# Patient Record
Sex: FEMALE | Race: WHITE | NOT HISPANIC OR LATINO | URBAN - METROPOLITAN AREA
[De-identification: names, ages, dates, MRNs, and addresses within clinical notes are randomized per-mention and may not be internally consistent; named-entity substitution may affect disease eponyms.]

---

## 2020-07-01 ENCOUNTER — INPATIENT (INPATIENT)
Facility: HOSPITAL | Age: 70
LOS: 0 days | Discharge: HOME HEALTH | End: 2020-07-02
Attending: INTERNAL MEDICINE | Admitting: INTERNAL MEDICINE
Payer: MEDICARE

## 2020-07-01 VITALS
RESPIRATION RATE: 19 BRPM | OXYGEN SATURATION: 100 % | HEART RATE: 56 BPM | SYSTOLIC BLOOD PRESSURE: 185 MMHG | DIASTOLIC BLOOD PRESSURE: 85 MMHG

## 2020-07-01 DIAGNOSIS — Z96.653 PRESENCE OF ARTIFICIAL KNEE JOINT, BILATERAL: Chronic | ICD-10-CM

## 2020-07-01 DIAGNOSIS — Z98.890 OTHER SPECIFIED POSTPROCEDURAL STATES: Chronic | ICD-10-CM

## 2020-07-01 DIAGNOSIS — K63.2 FISTULA OF INTESTINE: Chronic | ICD-10-CM

## 2020-07-01 LAB
ALBUMIN SERPL ELPH-MCNC: 4.1 G/DL — SIGNIFICANT CHANGE UP (ref 3.5–5.2)
ALP SERPL-CCNC: 72 U/L — SIGNIFICANT CHANGE UP (ref 30–115)
ALT FLD-CCNC: 7 U/L — SIGNIFICANT CHANGE UP (ref 0–41)
ANION GAP SERPL CALC-SCNC: 20 MMOL/L — HIGH (ref 7–14)
APPEARANCE UR: CLEAR — SIGNIFICANT CHANGE UP
APTT BLD: 24.6 SEC — LOW (ref 27–39.2)
AST SERPL-CCNC: 12 U/L — SIGNIFICANT CHANGE UP (ref 0–41)
BASE EXCESS BLDV CALC-SCNC: 4.9 MMOL/L — HIGH (ref -2–2)
BASOPHILS # BLD AUTO: 0.04 K/UL — SIGNIFICANT CHANGE UP (ref 0–0.2)
BASOPHILS NFR BLD AUTO: 0.5 % — SIGNIFICANT CHANGE UP (ref 0–1)
BILIRUB SERPL-MCNC: 1.2 MG/DL — SIGNIFICANT CHANGE UP (ref 0.2–1.2)
BILIRUB UR-MCNC: ABNORMAL
BUN SERPL-MCNC: 17 MG/DL — SIGNIFICANT CHANGE UP (ref 10–20)
CA-I SERPL-SCNC: 1.16 MMOL/L — SIGNIFICANT CHANGE UP (ref 1.12–1.3)
CALCIUM SERPL-MCNC: 10.2 MG/DL — HIGH (ref 8.5–10.1)
CHLORIDE SERPL-SCNC: 89 MMOL/L — LOW (ref 98–110)
CO2 SERPL-SCNC: 26 MMOL/L — SIGNIFICANT CHANGE UP (ref 17–32)
COLOR SPEC: YELLOW — SIGNIFICANT CHANGE UP
CREAT SERPL-MCNC: 0.7 MG/DL — SIGNIFICANT CHANGE UP (ref 0.7–1.5)
DIFF PNL FLD: NEGATIVE — SIGNIFICANT CHANGE UP
EOSINOPHIL # BLD AUTO: 0.02 K/UL — SIGNIFICANT CHANGE UP (ref 0–0.7)
EOSINOPHIL NFR BLD AUTO: 0.2 % — SIGNIFICANT CHANGE UP (ref 0–8)
GAS PNL BLDV: 133 MMOL/L — LOW (ref 136–145)
GAS PNL BLDV: SIGNIFICANT CHANGE UP
GLUCOSE SERPL-MCNC: 113 MG/DL — HIGH (ref 70–99)
GLUCOSE UR QL: NEGATIVE MG/DL — SIGNIFICANT CHANGE UP
HCO3 BLDV-SCNC: 30 MMOL/L — HIGH (ref 22–29)
HCT VFR BLD CALC: 45.8 % — SIGNIFICANT CHANGE UP (ref 37–47)
HCT VFR BLDA CALC: 49.5 % — HIGH (ref 34–44)
HGB BLD CALC-MCNC: 16.1 G/DL — SIGNIFICANT CHANGE UP (ref 14–18)
HGB BLD-MCNC: 16.1 G/DL — HIGH (ref 12–16)
HOROWITZ INDEX BLDV+IHG-RTO: 21 — SIGNIFICANT CHANGE UP
IMM GRANULOCYTES NFR BLD AUTO: 0.8 % — HIGH (ref 0.1–0.3)
INR BLD: 1.02 RATIO — SIGNIFICANT CHANGE UP (ref 0.65–1.3)
KETONES UR-MCNC: 40
LACTATE BLDV-MCNC: 1.9 MMOL/L — HIGH (ref 0.5–1.6)
LEUKOCYTE ESTERASE UR-ACNC: NEGATIVE — SIGNIFICANT CHANGE UP
LYMPHOCYTES # BLD AUTO: 0.72 K/UL — LOW (ref 1.2–3.4)
LYMPHOCYTES # BLD AUTO: 8.6 % — LOW (ref 20.5–51.1)
MAGNESIUM SERPL-MCNC: 1.7 MG/DL — LOW (ref 1.8–2.4)
MCHC RBC-ENTMCNC: 31 PG — SIGNIFICANT CHANGE UP (ref 27–31)
MCHC RBC-ENTMCNC: 35.2 G/DL — SIGNIFICANT CHANGE UP (ref 32–37)
MCV RBC AUTO: 88.1 FL — SIGNIFICANT CHANGE UP (ref 81–99)
MONOCYTES # BLD AUTO: 0.62 K/UL — HIGH (ref 0.1–0.6)
MONOCYTES NFR BLD AUTO: 7.4 % — SIGNIFICANT CHANGE UP (ref 1.7–9.3)
NEUTROPHILS # BLD AUTO: 6.89 K/UL — HIGH (ref 1.4–6.5)
NEUTROPHILS NFR BLD AUTO: 82.5 % — HIGH (ref 42.2–75.2)
NITRITE UR-MCNC: NEGATIVE — SIGNIFICANT CHANGE UP
NRBC # BLD: 0 /100 WBCS — SIGNIFICANT CHANGE UP (ref 0–0)
PCO2 BLDV: 46 MMHG — SIGNIFICANT CHANGE UP (ref 41–51)
PH BLDV: 7.43 — SIGNIFICANT CHANGE UP (ref 7.26–7.43)
PH UR: 6.5 — SIGNIFICANT CHANGE UP (ref 5–8)
PLATELET # BLD AUTO: 370 K/UL — SIGNIFICANT CHANGE UP (ref 130–400)
PO2 BLDV: 28 MMHG — SIGNIFICANT CHANGE UP (ref 20–40)
POTASSIUM BLDV-SCNC: 3.3 MMOL/L — SIGNIFICANT CHANGE UP (ref 3.3–5.6)
POTASSIUM SERPL-MCNC: 4.2 MMOL/L — SIGNIFICANT CHANGE UP (ref 3.5–5)
POTASSIUM SERPL-SCNC: 4.2 MMOL/L — SIGNIFICANT CHANGE UP (ref 3.5–5)
PROT SERPL-MCNC: 6.6 G/DL — SIGNIFICANT CHANGE UP (ref 6–8)
PROT UR-MCNC: NEGATIVE MG/DL — SIGNIFICANT CHANGE UP
PROTHROM AB SERPL-ACNC: 11.7 SEC — SIGNIFICANT CHANGE UP (ref 9.95–12.87)
RBC # BLD: 5.2 M/UL — SIGNIFICANT CHANGE UP (ref 4.2–5.4)
RBC # FLD: 12.7 % — SIGNIFICANT CHANGE UP (ref 11.5–14.5)
SAO2 % BLDV: 50 % — SIGNIFICANT CHANGE UP
SARS-COV-2 RNA SPEC QL NAA+PROBE: SIGNIFICANT CHANGE UP
SODIUM SERPL-SCNC: 135 MMOL/L — SIGNIFICANT CHANGE UP (ref 135–146)
SP GR SPEC: 1.01 — SIGNIFICANT CHANGE UP (ref 1.01–1.03)
TROPONIN T SERPL-MCNC: <0.01 NG/ML — SIGNIFICANT CHANGE UP
UROBILINOGEN FLD QL: 0.2 MG/DL — SIGNIFICANT CHANGE UP (ref 0.2–0.2)
WBC # BLD: 8.36 K/UL — SIGNIFICANT CHANGE UP (ref 4.8–10.8)
WBC # FLD AUTO: 8.36 K/UL — SIGNIFICANT CHANGE UP (ref 4.8–10.8)

## 2020-07-01 PROCEDURE — 99223 1ST HOSP IP/OBS HIGH 75: CPT

## 2020-07-01 PROCEDURE — 72170 X-RAY EXAM OF PELVIS: CPT | Mod: 26

## 2020-07-01 PROCEDURE — 71045 X-RAY EXAM CHEST 1 VIEW: CPT | Mod: 26

## 2020-07-01 PROCEDURE — 73590 X-RAY EXAM OF LOWER LEG: CPT | Mod: 26,LT

## 2020-07-01 PROCEDURE — 70450 CT HEAD/BRAIN W/O DYE: CPT | Mod: 26

## 2020-07-01 PROCEDURE — 72125 CT NECK SPINE W/O DYE: CPT | Mod: 26

## 2020-07-01 PROCEDURE — 99285 EMERGENCY DEPT VISIT HI MDM: CPT

## 2020-07-01 RX ORDER — AMLODIPINE BESYLATE 2.5 MG/1
2.5 TABLET ORAL ONCE
Refills: 0 | Status: COMPLETED | OUTPATIENT
Start: 2020-07-01 | End: 2020-07-01

## 2020-07-01 RX ORDER — MAGNESIUM SULFATE 500 MG/ML
2 VIAL (ML) INJECTION ONCE
Refills: 0 | Status: COMPLETED | OUTPATIENT
Start: 2020-07-01 | End: 2020-07-01

## 2020-07-01 RX ORDER — LABETALOL HCL 100 MG
200 TABLET ORAL EVERY 12 HOURS
Refills: 0 | Status: DISCONTINUED | OUTPATIENT
Start: 2020-07-01 | End: 2020-07-01

## 2020-07-01 RX ORDER — SODIUM CHLORIDE 9 MG/ML
1000 INJECTION INTRAMUSCULAR; INTRAVENOUS; SUBCUTANEOUS
Refills: 0 | Status: DISCONTINUED | OUTPATIENT
Start: 2020-07-01 | End: 2020-07-02

## 2020-07-01 RX ORDER — SODIUM CHLORIDE 9 MG/ML
1000 INJECTION INTRAMUSCULAR; INTRAVENOUS; SUBCUTANEOUS ONCE
Refills: 0 | Status: COMPLETED | OUTPATIENT
Start: 2020-07-01 | End: 2020-07-01

## 2020-07-01 RX ORDER — HYDRALAZINE HCL 50 MG
50 TABLET ORAL EVERY 12 HOURS
Refills: 0 | Status: DISCONTINUED | OUTPATIENT
Start: 2020-07-01 | End: 2020-07-02

## 2020-07-01 RX ADMIN — AMLODIPINE BESYLATE 2.5 MILLIGRAM(S): 2.5 TABLET ORAL at 23:55

## 2020-07-01 RX ADMIN — Medication 50 MILLIGRAM(S): at 18:18

## 2020-07-01 RX ADMIN — SODIUM CHLORIDE 80 MILLILITER(S): 9 INJECTION INTRAMUSCULAR; INTRAVENOUS; SUBCUTANEOUS at 18:19

## 2020-07-01 RX ADMIN — SODIUM CHLORIDE 1000 MILLILITER(S): 9 INJECTION INTRAMUSCULAR; INTRAVENOUS; SUBCUTANEOUS at 15:08

## 2020-07-01 RX ADMIN — Medication 50 GRAM(S): at 15:08

## 2020-07-01 NOTE — ED ADULT NURSE NOTE - CHPI ED NUR SYMPTOMS NEG
no change in level of consciousness/no fever/no vomiting/no blurred vision/no weakness/no loss of consciousness/no nausea/no numbness/no dizziness

## 2020-07-01 NOTE — ED PROVIDER NOTE - CLINICAL SUMMARY MEDICAL DECISION MAKING FREE TEXT BOX
69yF unknown PMH p/w AMS - likely metabolic encephalopathy, possibly dehydration given hypochloremia (though low chloride also possibly compensatory given other concurrent acid-base derangements).  CXR and UA w/o apparent infection, though UA w/ ketones suggestive of poor recent PO intake.  Pt w/o apparent traumatic injury, pleasant if confused, well kempt.  May be subacute presentation of dementia, patrick given mis-identification of family members, paranoia about 's behavior, but will benefit from admission and further workup.

## 2020-07-01 NOTE — H&P ADULT - HISTORY OF PRESENT ILLNESS
The pt is a 69y F with no PMH is presenting to ED with AMS x 4 days. Pt's  states that she has been having delusions of her half-brother living in their house and stealing her belongings. He also endorses periods of confusion where she is talking to herself and denies when asked if she was. Patient has been admitted to [facility] many years ago for a similar issue, but  is unable to recall what her diagnosis was at the time. She has had very poor oral intake in the last week. Patient herself is alert and oriented x 4. She was insistent that her half-brother is at still her house while I was speaking to her. MMSE score was 30

## 2020-07-01 NOTE — ED ADULT TRIAGE NOTE - CHIEF COMPLAINT QUOTE
BIBA via [facility] from home, EMS states that  called because patient has been experiencing confusion for "a couple of days" states that she mistakes him for her brother in law, and has decreased PO intake. on arrival patient is oriented to person unable to explain why EMS was called.

## 2020-07-01 NOTE — ED PROVIDER NOTE - CARE PLAN
Principal Discharge DX:	Encephalopathy  Secondary Diagnosis:	Hypochloremia  Secondary Diagnosis:	Hypomagnesemia  Secondary Diagnosis:	Fall from standing, initial encounter Principal Discharge DX:	Encephalopathy  Secondary Diagnosis:	Hypochloremia  Secondary Diagnosis:	Hypomagnesemia  Secondary Diagnosis:	Fall from standing, initial encounter  Secondary Diagnosis:	Inability to perform activities of daily living

## 2020-07-01 NOTE — ED PROVIDER NOTE - ATTENDING CONTRIBUTION TO CARE
69yF w/o known PMH (as per pt, no records seen in EMR) p/w altered mental status. Hx provided by pt w/ collateral from .  Pt says she has been a little weak for the past few days and fell yesterday onto her backside.  She denies hitting her head.  Denies fever, abd pain, n/v/d, CP, SOB.  Denies any PMH or prescribed medications.  Hx provided by  is that she has been confused, which worsened today.  She thinks her  is her step-brother and says he has been living with her for the past 6mo, has been stealing from her and refuses to leave when she asks him to do so and thinks he is the cause of her ED visit today unjustly.    VS notable for /85  CONSTITUTIONAL: well developed; well nourished; well appearing in no acute distress  HEAD: normocephalic; atraumatic  EYES: no conjunctival injection, no scleral icterus  ENT: no nasal discharge; airway clear, mildly dry MM  NECK: supple; non tender. + full passive ROM in all directions, no c-spine tenderness to palpation   CARD: S1, S2 normal; no murmurs, gallops, or rubs. Regular rate and rhythm  RESP: no wheezes, rales or rhonchi. Good air movement bilaterally without significant accessory muscle use  ABD: soft; non-distended; non-tender. No rebound, no guarding, no pulsatile abdominal mass  EXT: moving all extremities spontaneously, normal ROM. No clubbing, cyanosis or edema  SKIN: warm and dry, no lesions noted  NEURO: alert, oriented, CN II-XII grossly intact, motor and sensory grossly intact, speech nonslurred, no focal deficits. GCS 15  PSYCH: calm, cooperative, appropriate, good eye contact, logical thought process, no apparent danger to self or others

## 2020-07-01 NOTE — ED PROVIDER NOTE - NS ED ROS FT
GEN: (-) fever, (-) chills, (-) malaise, (+) AMS  HEENT: (-) vision changes, (-) HA  CV: (-) chest pain, (-) palpitations, (-) edema  PULM: (-) cough, (-) wheezing, (-) dyspnea  GI: (-) abdominal pain,(-) Nausea, (-) Vomiting, (-) Diarrhea, (-) Melena  NEURO: (-) weakness, (-) paresthesias, (-) syncope, (-) lightheadedness, (-) dizziness  : (-) dysuria, (+) frequency, (-) urgency, (-) incontinence, (-) hematuria  MS: (-) back pain, (-) joint pain, (-)myalgias, (-) swelling  SKIN: (-) rashes, (-) new lesions  HEME: (-) bleeding, (-) ecchymosis  PSYCH: (-) anxiety, (-) depression, (-) hallucinations, (-) suicidal ideation

## 2020-07-01 NOTE — ED PROVIDER NOTE - PHYSICAL EXAMINATION
GEN: Alert & Oriented x 2, No acute distress. Calm, appropriate.  Head and Neck: Normocephalic, atraumatic.   ENT:Oral mucosa pink, dry without lesions.   Eyes: PERRL. EOMI, Horizontal nystagmus. No conjunctival injection. No scleral icterus.   RESP: Lungs clear to auscult bilat. no wheezes, rhonchi or rales. No retractions. Equal air entry.  CARDIO: regular rate and rhythm, no murmurs, rubs or gallops. Normal S1, S2.  Radial pulses 2+ bilaterally. No lower extremity edema.  ABD: Soft, Nondistended. No rebound tenderness/guarding.  No pulsatile mass. No tenderness with palpation x 4 quadrants.  MS: no midline tenderness throughout. FROM neck and upper/lower ext.   SKIN: no rashes/lesions, no petechiae. ecchymosis noted to left lower ext.  Neuro: A&O x2, CN II- XII intact, strength 5/5 throughout extremities, sensation intact. Speech & mentation intact. No drooping of eye or mouth. Without dysarthria or aphasia. Finger to nose intact. no nuchal rigidity.   PSYCH: Appropriate affect. No hallucinations

## 2020-07-01 NOTE — H&P ADULT - ASSESSMENT
69y F with no PMH is presenting to ED with AMS x 4 days. Admitted for metabolic encephalopathy and paranoid delusions    #Metabolic encephalopathy d/t poor oral intake/dehydration with paranoid delusions  - Ketones positive in urine  - Psych consult requested  - PT eval  - IVF hydration  - Repeat BMP    #h/o b/l Knee replacement  - Tylenol prn for pain  - PT    DVT PPX, heparin 69y F with no PMH is presenting to ED with AMS x 4 days. Admitted for metabolic encephalopathy and paranoid delusions    #Metabolic encephalopathy/Weakness d/t poor oral intake/dehydration + paranoid delusions  - Ketones positive in urine indicating poor oral intake  - MMSE 30/30  - Psych consult requested  - PT eval  - IVF hydration  - Repeat BMP  - f/u B12, folate, TSH    #h/o b/l Knee replacement  - Tylenol prn for pain  - PT    DVT PPX, heparin

## 2020-07-01 NOTE — ED PROVIDER NOTE - SECONDARY DIAGNOSIS.
Hypochloremia Hypomagnesemia Fall from standing, initial encounter Inability to perform activities of daily living

## 2020-07-01 NOTE — PATIENT PROFILE ADULT - DISASTER - NSPROGENBLOODRESTRICT_GEN_A_NUR
Pre-Visit Chart Review  For Appointment Scheduled on 1/4/16.    Health Maintenance Due   Topic Date Due    TETANUS VACCINE  06/29/1952    Zoster Vaccine  06/29/1996    Influenza Vaccine  08/01/2016    Pneumococcal (65+) (2 of 2 - PPSV23) 12/12/2016                     
none

## 2020-07-01 NOTE — ED PROVIDER NOTE - OBJECTIVE STATEMENT
The pt is a 69y F with no PMH is presenting to ED with AMS x 4 days. Per  pt has had worsening confusion over the past 4 days, he states she thinks he is her brother in law and getting agitated. He also states she has had decreased PO intake and generalized weakness. He states this has happened in the past, but only lasted a day or so. pt endorses urinary frequency and states she fell onto her bottom 1 wk ago, denies hitting head or LOC. Pt denies dizziness, lightheadedness, cp, sob, cough, f/c/n/v/d, dysuria, hematuria, back pain, neck pain, HA.

## 2020-07-01 NOTE — ED ADULT NURSE NOTE - INTERVENTIONS DEFINITIONS
[place] to call system/Provide visual cue, wrist band, yellow gown, etc./Monitor gait and stability/Instruct patient to call for assistance/Call bell, personal items and telephone within reach/Room bathroom lighting operational/Non-slip footwear when patient is off stretcher/Physically safe environment: no spills, clutter or unnecessary equipment/Provide visual clues: red socks/Stretcher in lowest position, wheels locked, appropriate side rails in place/Monitor for mental status changes and reorient to person, place, and time

## 2020-07-02 ENCOUNTER — TRANSCRIPTION ENCOUNTER (OUTPATIENT)
Age: 70
End: 2020-07-02

## 2020-07-02 VITALS — SYSTOLIC BLOOD PRESSURE: 107 MMHG | DIASTOLIC BLOOD PRESSURE: 59 MMHG | TEMPERATURE: 97 F | HEART RATE: 72 BPM

## 2020-07-02 PROBLEM — Z00.00 ENCOUNTER FOR PREVENTIVE HEALTH EXAMINATION: Status: ACTIVE | Noted: 2020-07-02

## 2020-07-02 LAB
ANION GAP SERPL CALC-SCNC: 17 MMOL/L — HIGH (ref 7–14)
BUN SERPL-MCNC: 13 MG/DL — SIGNIFICANT CHANGE UP (ref 10–20)
CALCIUM SERPL-MCNC: 9.3 MG/DL — SIGNIFICANT CHANGE UP (ref 8.5–10.1)
CHLORIDE SERPL-SCNC: 91 MMOL/L — LOW (ref 98–110)
CO2 SERPL-SCNC: 23 MMOL/L — SIGNIFICANT CHANGE UP (ref 17–32)
CREAT SERPL-MCNC: 0.6 MG/DL — LOW (ref 0.7–1.5)
CULTURE RESULTS: NO GROWTH — SIGNIFICANT CHANGE UP
GLUCOSE SERPL-MCNC: 94 MG/DL — SIGNIFICANT CHANGE UP (ref 70–99)
HCT VFR BLD CALC: 45.9 % — SIGNIFICANT CHANGE UP (ref 37–47)
HCV AB S/CO SERPL IA: 0.03 COI — SIGNIFICANT CHANGE UP
HCV AB SERPL-IMP: SIGNIFICANT CHANGE UP
HGB BLD-MCNC: 16 G/DL — SIGNIFICANT CHANGE UP (ref 12–16)
MCHC RBC-ENTMCNC: 31.1 PG — HIGH (ref 27–31)
MCHC RBC-ENTMCNC: 34.9 G/DL — SIGNIFICANT CHANGE UP (ref 32–37)
MCV RBC AUTO: 89.3 FL — SIGNIFICANT CHANGE UP (ref 81–99)
NRBC # BLD: 0 /100 WBCS — SIGNIFICANT CHANGE UP (ref 0–0)
PLATELET # BLD AUTO: 388 K/UL — SIGNIFICANT CHANGE UP (ref 130–400)
POTASSIUM SERPL-MCNC: 3.6 MMOL/L — SIGNIFICANT CHANGE UP (ref 3.5–5)
POTASSIUM SERPL-SCNC: 3.6 MMOL/L — SIGNIFICANT CHANGE UP (ref 3.5–5)
RBC # BLD: 5.14 M/UL — SIGNIFICANT CHANGE UP (ref 4.2–5.4)
RBC # FLD: 12.8 % — SIGNIFICANT CHANGE UP (ref 11.5–14.5)
SARS-COV-2 IGG SERPL QL IA: NEGATIVE — SIGNIFICANT CHANGE UP
SARS-COV-2 IGM SERPL IA-ACNC: 0.09 INDEX — SIGNIFICANT CHANGE UP
SODIUM SERPL-SCNC: 131 MMOL/L — LOW (ref 135–146)
SPECIMEN SOURCE: SIGNIFICANT CHANGE UP
WBC # BLD: 9.31 K/UL — SIGNIFICANT CHANGE UP (ref 4.8–10.8)
WBC # FLD AUTO: 9.31 K/UL — SIGNIFICANT CHANGE UP (ref 4.8–10.8)

## 2020-07-02 PROCEDURE — 99221 1ST HOSP IP/OBS SF/LOW 40: CPT

## 2020-07-02 PROCEDURE — 99238 HOSP IP/OBS DSCHRG MGMT 30/<: CPT

## 2020-07-02 RX ORDER — RISPERIDONE 4 MG/1
1 TABLET ORAL
Qty: 60 | Refills: 0
Start: 2020-07-02 | End: 2020-07-31

## 2020-07-02 RX ADMIN — Medication 50 MILLIGRAM(S): at 05:23

## 2020-07-02 NOTE — CONSULT NOTE ADULT - ASSESSMENT
Chronic schizophrenia paranoid residual     no need or benefit form ipp  Risperdal 0.5 po q hs  out patient f/u.

## 2020-07-02 NOTE — PHYSICAL THERAPY INITIAL EVALUATION ADULT - IMPAIRED TRANSFERS: SIT/STAND, REHAB EVAL
decreased strength/decreased endurance ; patient c/o dizziness/impaired balance/impaired postural control

## 2020-07-02 NOTE — DISCHARGE NOTE PROVIDER - HOSPITAL COURSE
The pt is a 69y F with no PMH is presenting to ED with AMS x 4 days. Pt's  states that she has been having delusions of her half-brother living in their house and stealing her belongings. He also endorses periods of confusion where she is talking to herself and denies when asked if she was. Patient has been admitted to [facility] many years ago for a similar issue, but  is unable to recall what her diagnosis was at the time. She has had very poor oral intake in the last week. Patient herself is alert and oriented x 4. She was insistent that her half-brother is at still her house while I was speaking to her. MMSE score was 30        Psychiatry evaluated patient and obtained more history. Patient diagnosed with chronic schizophrenia. There was no indication for IPP admission. Patient is not at risk for harm to self or others. Recommended starting risperdal 0.5mg once a day at bedtime. She is Medically stable for discharge.

## 2020-07-02 NOTE — DISCHARGE NOTE PROVIDER - NSDCCPCAREPLAN_GEN_ALL_CORE_FT
PRINCIPAL DISCHARGE DIAGNOSIS  Diagnosis: Adult failure to thrive  Assessment and Plan of Treatment: Take risperidone 0.5mg once a day at bedtime. Home visit follow up referral has been given.      SECONDARY DISCHARGE DIAGNOSES  Diagnosis: Inability to perform activities of daily living  Assessment and Plan of Treatment:     Diagnosis: Fall from standing, initial encounter  Assessment and Plan of Treatment:     Diagnosis: Hypomagnesemia  Assessment and Plan of Treatment:     Diagnosis: Hypochloremia  Assessment and Plan of Treatment:

## 2020-07-02 NOTE — PHYSICAL THERAPY INITIAL EVALUATION ADULT - GENERAL OBSERVATIONS, REHAB EVAL
9:05 - 9:30. Chart reviewed. Patient available to be seen for physical therapy, confirmed with nurse. Patient encountered semi-reclined in bed, +IV(disconnected by RN), +prima fit.  Denies pain at rest, agreeable for PT evaluation.

## 2020-07-02 NOTE — PROGRESS NOTE ADULT - ASSESSMENT
69y F with no PMH is presenting to ED with AMS x 4 days. Admitted for metabolic encephalopathy and paranoid delusions    #Metabolic encephalopathy/Weakness d/t poor oral intake/dehydration + paranoid delusions  - Ketones positive in urine indicating poor oral intake  - PT eval  - IVF hydration  - f/u B12, folate, TSH    #h/o b/l Knee replacement  - Tylenol prn for pain  - PT    DVT PPX, heparin 69y F with no PMH is presenting to ED with AMS x 4 days. Admitted for metabolic encephalopathy and paranoid delusions    #Failure to thrive, chronic schizophrenia w fixed delusion, no behavioral disturbance  - Ketones positive in urine indicating poor oral intake  - Per psych, recd risperdal 0.5mg qHs. No benefit from IPP admission at this time, clear for discharge  - PT eval, only able to ambulate 3ft due to weakness  - IVF hydration  - DC planning    #h/o b/l Knee replacement  - Tylenol prn for pain  - PT    DVT PPX, heparin    #Progress Note Handoff  Pending (specify): DC planning  Family discussion: d/w pt regarding psych consult  Disposition: SNF vs A

## 2020-07-02 NOTE — PHYSICAL THERAPY INITIAL EVALUATION ADULT - GAIT DEVIATIONS NOTED, PT EVAL
increased time in double stance/decreased riley/decreased step length/decreased weight-shifting ability/decreased heel strike / push off

## 2020-07-02 NOTE — PROGRESS NOTE ADULT - SUBJECTIVE AND OBJECTIVE BOX
CLARIBEL, ANURAG  69y, Female  Allergy: No Known Allergies    Hospital Day: 1d    Patient seen and examined earlier today. No acute events overnight.    PMH/PSH:  PAST MEDICAL & SURGICAL HISTORY:  No pertinent past medical history  History of back surgery  Abdominal fistula  History of bilateral knee replacement    VITALS:  T(F): 96.8 (20 @ 05:56), Max: 98.2 (20 @ 13:42)  HR: 65 (20 @ 07:46)  BP: 144/86 (20 @ 05:56) (138/71 - 191/98)  RR: 18 (20 @ 07:46)  SpO2: 99% (20 @ 07:46)    TESTS & MEASUREMENTS:  Weight (Kg): 51.9 (20 @ 17:17)  BMI (kg/m2): 19.6 ()                        16.0   9.31  )-----------( 388      ( 2020 06:46 )             45.9     PT/INR - ( 2020 13:38 )   PT: 11.70 sec;   INR: 1.02 ratio      PTT - ( 2020 13:38 )  PTT:24.6 sec      131<L>  |  91<L>  |  13  ----------------------------<  94  3.6   |  23  |  0.6<L>    Ca    9.3      2020 06:46  Mg     1.7     07-    TPro  6.6  /  Alb  4.1  /  TBili  1.2  /  DBili  x   /  AST  12  /  ALT  7   /  AlkPhos  72  07-01    LIVER FUNCTIONS - ( 2020 13:38 )  Alb: 4.1 g/dL / Pro: 6.6 g/dL / ALK PHOS: 72 U/L / ALT: 7 U/L / AST: 12 U/L / GGT: x           CARDIAC MARKERS ( 2020 13:38 )  x     / <0.01 ng/mL / x     / x     / x        Urinalysis Basic - ( 2020 13:38 )    Color: Yellow / Appearance: Clear / S.015 / pH: x  Gluc: x / Ketone: 40  / Bili: Small / Urobili: 0.2 mg/dL   Blood: x / Protein: Negative mg/dL / Nitrite: Negative   Leuk Esterase: Negative / RBC: x / WBC x   Sq Epi: x / Non Sq Epi: x / Bacteria: x    RECENT DIAGNOSTIC ORDERS:  Culture - Blood: Routine  Specimen Source: Blood-Peripheral  Addl Info: Peripheral Site 1 (20 @ 13:05)  Culture - Blood: Routine  Specimen Source: Blood-Peripheral  Addl Info: Peripheral Site 2 (20 @ 13:05)  Culture - Urine: Routine  Specimen Source: Clean Catch (Midstream) (20 @ 13:05)  Drug Screen 1, Urine: STAT (20 @ 15:59)  Hepatitis C Antibody Screen: AM Sched. Collection: 2020 04:30 (20 @ 17:23)  Diet, Regular (20 @ 17:24)  Vitamin B12, Serum: AM Sched. Collection: 2020 04:30 (20 @ 17:26)  Thyroid Stimulating Hormone, Serum: AM Sched. Collection: 2020 04:30 (20 @ 17:26)  Folate, Serum: AM Sched. Collection: 2020 04:30 (20 @ 17:26)    MEDICATIONS:  MEDICATIONS  (STANDING):  hydrALAZINE 50 milliGRAM(s) Oral every 12 hours  sodium chloride 0.9%. 1000 milliLiter(s) (80 mL/Hr) IV Continuous <Continuous>    MEDICATIONS  (PRN):      HOME MEDICATIONS:      REVIEW OF SYSTEMS:  All other review of systems is negative unless indicated above.     PHYSICAL EXAM:  GENERAL: NAD  HEENT: No Swelling  CHEST/LUNG: Good air entry, No wheezing  HEART: RRR, No murmurs  ABDOMEN: Soft, Bowel sounds present  EXTREMITIES:  No clubbing

## 2020-07-02 NOTE — CONSULT NOTE ADULT - SUBJECTIVE AND OBJECTIVE BOX
Reason for Admission: AMS	  History of Present Illness: 	  The pt is a 69y F with no PMH is presenting to ED with AMS x 4 days. Pt's  states that she has been having delusions of her half-brother living in their house and stealing her belongings. He also endorses periods of confusion where she is talking to herself and denies when asked if she was. Patient has been admitted to [facility] many years ago for a similar issue, but  is unable to recall what her diagnosis was at the time. She has had very poor oral intake in the last week. Patient herself is alert and oriented x 4. She was insistent that her half-brother is at still her house while I was speaking to her. MMSE score was 30    collateral info obtained from spouse. pt has hx of psychosis requiring ipp 14 plus years ago. since that discharge she has not seen psych MD, or taken any psych medications. her complaint about some one stealing her money , talking to self at times and episodic agitation is going on for all these years. he is able to manage situations and she is able to function in the community. no suicidal behaviors. she sleeps well, generally eats well.     pt is observed to be calm resting well. she communicated with Dr. Bennett quite well. however she does not want to engage with me psychiatrist answering questions yes / no. this implies her guarded and paranoid approach to psych MD. She has been calm, not agitated or aggressive in the hospital and follows directions. no clinical evidence of delirium or cognitive deficits.

## 2020-07-02 NOTE — DISCHARGE NOTE NURSING/CASE MANAGEMENT/SOCIAL WORK - PATIENT PORTAL LINK FT
You can access the FollowMyHealth Patient Portal offered by [facility] by registering at the following website: [email or web address]. By joining [employer]’s FollowMyHealth portal, you will also be able to view your health information using other applications (apps) compatible with our system.

## 2020-07-02 NOTE — PHYSICAL THERAPY INITIAL EVALUATION ADULT - IMPAIRMENTS FOUND, PT EVAL
aerobic capacity/endurance/gait, locomotion, and balance/posture/muscle strength/ergonomics and body mechanics

## 2020-07-06 DIAGNOSIS — E86.0 DEHYDRATION: ICD-10-CM

## 2020-07-06 DIAGNOSIS — G93.41 METABOLIC ENCEPHALOPATHY: ICD-10-CM

## 2020-07-06 DIAGNOSIS — E83.42 HYPOMAGNESEMIA: ICD-10-CM

## 2020-07-06 DIAGNOSIS — E87.8 OTHER DISORDERS OF ELECTROLYTE AND FLUID BALANCE, NOT ELSEWHERE CLASSIFIED: ICD-10-CM

## 2020-07-06 DIAGNOSIS — R62.7 ADULT FAILURE TO THRIVE: ICD-10-CM

## 2020-07-06 DIAGNOSIS — Z91.81 HISTORY OF FALLING: ICD-10-CM

## 2020-07-06 DIAGNOSIS — F20.0 PARANOID SCHIZOPHRENIA: ICD-10-CM

## 2020-07-06 DIAGNOSIS — Z96.653 PRESENCE OF ARTIFICIAL KNEE JOINT, BILATERAL: ICD-10-CM

## 2020-07-06 LAB
CULTURE RESULTS: SIGNIFICANT CHANGE UP
CULTURE RESULTS: SIGNIFICANT CHANGE UP
SPECIMEN SOURCE: SIGNIFICANT CHANGE UP
SPECIMEN SOURCE: SIGNIFICANT CHANGE UP

## 2020-07-07 ENCOUNTER — INPATIENT (INPATIENT)
Facility: HOSPITAL | Age: 70
LOS: 2 days | Discharge: NURSING, REHAB OR LONG-TERM CARE | End: 2020-07-10
Attending: HOSPITALIST | Admitting: HOSPITALIST
Payer: MEDICARE

## 2020-07-07 VITALS
OXYGEN SATURATION: 100 % | SYSTOLIC BLOOD PRESSURE: 163 MMHG | RESPIRATION RATE: 18 BRPM | TEMPERATURE: 97 F | DIASTOLIC BLOOD PRESSURE: 74 MMHG | HEART RATE: 87 BPM | HEIGHT: 64 IN

## 2020-07-07 DIAGNOSIS — R74.0 NONSPECIFIC ELEVATION OF LEVELS OF TRANSAMINASE AND LACTIC ACID DEHYDROGENASE [LDH]: ICD-10-CM

## 2020-07-07 DIAGNOSIS — Z98.890 OTHER SPECIFIED POSTPROCEDURAL STATES: Chronic | ICD-10-CM

## 2020-07-07 DIAGNOSIS — Z96.653 PRESENCE OF ARTIFICIAL KNEE JOINT, BILATERAL: Chronic | ICD-10-CM

## 2020-07-07 DIAGNOSIS — K63.2 FISTULA OF INTESTINE: Chronic | ICD-10-CM

## 2020-07-07 LAB
ALBUMIN SERPL ELPH-MCNC: 4.2 G/DL — SIGNIFICANT CHANGE UP (ref 3.5–5.2)
ALP SERPL-CCNC: 130 U/L — HIGH (ref 30–115)
ALT FLD-CCNC: 78 U/L — HIGH (ref 0–41)
ANION GAP SERPL CALC-SCNC: 14 MMOL/L — SIGNIFICANT CHANGE UP (ref 7–14)
APPEARANCE UR: CLEAR — SIGNIFICANT CHANGE UP
AST SERPL-CCNC: 84 U/L — HIGH (ref 0–41)
BACTERIA # UR AUTO: ABNORMAL
BASOPHILS # BLD AUTO: 0.06 K/UL — SIGNIFICANT CHANGE UP (ref 0–0.2)
BASOPHILS NFR BLD AUTO: 0.7 % — SIGNIFICANT CHANGE UP (ref 0–1)
BILIRUB SERPL-MCNC: 0.5 MG/DL — SIGNIFICANT CHANGE UP (ref 0.2–1.2)
BILIRUB UR-MCNC: NEGATIVE — SIGNIFICANT CHANGE UP
BUN SERPL-MCNC: 18 MG/DL — SIGNIFICANT CHANGE UP (ref 10–20)
CALCIUM SERPL-MCNC: 9.9 MG/DL — SIGNIFICANT CHANGE UP (ref 8.5–10.1)
CHLORIDE SERPL-SCNC: 95 MMOL/L — LOW (ref 98–110)
CK SERPL-CCNC: 36 U/L — SIGNIFICANT CHANGE UP (ref 0–225)
CO2 SERPL-SCNC: 27 MMOL/L — SIGNIFICANT CHANGE UP (ref 17–32)
COLOR SPEC: YELLOW — SIGNIFICANT CHANGE UP
CREAT SERPL-MCNC: 0.7 MG/DL — SIGNIFICANT CHANGE UP (ref 0.7–1.5)
DIFF PNL FLD: ABNORMAL
EOSINOPHIL # BLD AUTO: 0.08 K/UL — SIGNIFICANT CHANGE UP (ref 0–0.7)
EOSINOPHIL NFR BLD AUTO: 0.9 % — SIGNIFICANT CHANGE UP (ref 0–8)
EPI CELLS # UR: ABNORMAL /HPF
GLUCOSE SERPL-MCNC: 93 MG/DL — SIGNIFICANT CHANGE UP (ref 70–99)
GLUCOSE UR QL: NEGATIVE MG/DL — SIGNIFICANT CHANGE UP
HCT VFR BLD CALC: 41.8 % — SIGNIFICANT CHANGE UP (ref 37–47)
HGB BLD-MCNC: 14.2 G/DL — SIGNIFICANT CHANGE UP (ref 12–16)
IMM GRANULOCYTES NFR BLD AUTO: 2 % — HIGH (ref 0.1–0.3)
KETONES UR-MCNC: NEGATIVE — SIGNIFICANT CHANGE UP
LEUKOCYTE ESTERASE UR-ACNC: ABNORMAL
LYMPHOCYTES # BLD AUTO: 0.96 K/UL — LOW (ref 1.2–3.4)
LYMPHOCYTES # BLD AUTO: 10.6 % — LOW (ref 20.5–51.1)
MCHC RBC-ENTMCNC: 30.9 PG — SIGNIFICANT CHANGE UP (ref 27–31)
MCHC RBC-ENTMCNC: 34 G/DL — SIGNIFICANT CHANGE UP (ref 32–37)
MCV RBC AUTO: 91.1 FL — SIGNIFICANT CHANGE UP (ref 81–99)
MONOCYTES # BLD AUTO: 1.12 K/UL — HIGH (ref 0.1–0.6)
MONOCYTES NFR BLD AUTO: 12.4 % — HIGH (ref 1.7–9.3)
NEUTROPHILS # BLD AUTO: 6.64 K/UL — HIGH (ref 1.4–6.5)
NEUTROPHILS NFR BLD AUTO: 73.4 % — SIGNIFICANT CHANGE UP (ref 42.2–75.2)
NITRITE UR-MCNC: NEGATIVE — SIGNIFICANT CHANGE UP
NRBC # BLD: 0 /100 WBCS — SIGNIFICANT CHANGE UP (ref 0–0)
PH UR: 7 — SIGNIFICANT CHANGE UP (ref 5–8)
PLATELET # BLD AUTO: 319 K/UL — SIGNIFICANT CHANGE UP (ref 130–400)
POTASSIUM SERPL-MCNC: 3.7 MMOL/L — SIGNIFICANT CHANGE UP (ref 3.5–5)
POTASSIUM SERPL-SCNC: 3.7 MMOL/L — SIGNIFICANT CHANGE UP (ref 3.5–5)
PROT SERPL-MCNC: 6.6 G/DL — SIGNIFICANT CHANGE UP (ref 6–8)
PROT UR-MCNC: ABNORMAL MG/DL
RBC # BLD: 4.59 M/UL — SIGNIFICANT CHANGE UP (ref 4.2–5.4)
RBC # FLD: 13.2 % — SIGNIFICANT CHANGE UP (ref 11.5–14.5)
RBC CASTS # UR COMP ASSIST: ABNORMAL /HPF
SARS-COV-2 RNA SPEC QL NAA+PROBE: SIGNIFICANT CHANGE UP
SODIUM SERPL-SCNC: 136 MMOL/L — SIGNIFICANT CHANGE UP (ref 135–146)
SP GR SPEC: 1.01 — SIGNIFICANT CHANGE UP (ref 1.01–1.03)
TROPONIN T SERPL-MCNC: <0.01 NG/ML — SIGNIFICANT CHANGE UP
UROBILINOGEN FLD QL: 0.2 MG/DL — SIGNIFICANT CHANGE UP (ref 0.2–0.2)
WBC # BLD: 9.04 K/UL — SIGNIFICANT CHANGE UP (ref 4.8–10.8)
WBC # FLD AUTO: 9.04 K/UL — SIGNIFICANT CHANGE UP (ref 4.8–10.8)
WBC UR QL: >50 /HPF

## 2020-07-07 PROCEDURE — 71045 X-RAY EXAM CHEST 1 VIEW: CPT | Mod: 26,59

## 2020-07-07 PROCEDURE — 70450 CT HEAD/BRAIN W/O DYE: CPT | Mod: 26

## 2020-07-07 PROCEDURE — 99222 1ST HOSP IP/OBS MODERATE 55: CPT

## 2020-07-07 PROCEDURE — 99285 EMERGENCY DEPT VISIT HI MDM: CPT

## 2020-07-07 PROCEDURE — 71046 X-RAY EXAM CHEST 2 VIEWS: CPT | Mod: 26

## 2020-07-07 RX ORDER — ENOXAPARIN SODIUM 100 MG/ML
40 INJECTION SUBCUTANEOUS AT BEDTIME
Refills: 0 | Status: DISCONTINUED | OUTPATIENT
Start: 2020-07-07 | End: 2020-07-10

## 2020-07-07 RX ORDER — CEFPODOXIME PROXETIL 100 MG
200 TABLET ORAL ONCE
Refills: 0 | Status: COMPLETED | OUTPATIENT
Start: 2020-07-07 | End: 2020-07-07

## 2020-07-07 RX ORDER — RISPERIDONE 4 MG/1
0.5 TABLET ORAL AT BEDTIME
Refills: 0 | Status: DISCONTINUED | OUTPATIENT
Start: 2020-07-07 | End: 2020-07-10

## 2020-07-07 RX ORDER — POLYMYXIN B SULF/TRIMETHOPRIM 10000-1/ML
1 DROPS OPHTHALMIC (EYE) ONCE
Refills: 0 | Status: COMPLETED | OUTPATIENT
Start: 2020-07-07 | End: 2020-07-07

## 2020-07-07 RX ORDER — CEFTRIAXONE 500 MG/1
1000 INJECTION, POWDER, FOR SOLUTION INTRAMUSCULAR; INTRAVENOUS ONCE
Refills: 0 | Status: DISCONTINUED | OUTPATIENT
Start: 2020-07-07 | End: 2020-07-07

## 2020-07-07 RX ORDER — ACETAMINOPHEN 500 MG
650 TABLET ORAL EVERY 4 HOURS
Refills: 0 | Status: DISCONTINUED | OUTPATIENT
Start: 2020-07-07 | End: 2020-07-10

## 2020-07-07 RX ADMIN — ENOXAPARIN SODIUM 40 MILLIGRAM(S): 100 INJECTION SUBCUTANEOUS at 21:14

## 2020-07-07 RX ADMIN — Medication 200 MILLIGRAM(S): at 13:32

## 2020-07-07 RX ADMIN — Medication 1 DROP(S): at 11:34

## 2020-07-07 RX ADMIN — RISPERIDONE 0.5 MILLIGRAM(S): 4 TABLET ORAL at 21:15

## 2020-07-07 NOTE — H&P ADULT - NSICDXPASTSURGICALHX_GEN_ALL_CORE_FT
PAST SURGICAL HISTORY:  Abdominal fistula     History of back surgery     History of bilateral knee replacement

## 2020-07-07 NOTE — PHYSICAL THERAPY INITIAL EVALUATION ADULT - TRANSFER SAFETY CONCERNS NOTED: SIT/STAND, REHAB EVAL
decreased safety awareness/losing balance/decreased weight-shifting ability/decreased step length/decreased balance during turns/decreased sequencing ability

## 2020-07-07 NOTE — ED PROVIDER NOTE - PROGRESS NOTE DETAILS
ATTENDING NOTE: 70 y/o F without any medical complaint. NAD, vital signs noted. Chest clear, neuro intact. Diagnostic testing reviewed. May require placement. dr. zamorano aware, aware of cxr, will follow up inpt.

## 2020-07-07 NOTE — ED ADULT TRIAGE NOTE - CHIEF COMPLAINT QUOTE
Per EMS,  called because he cannot take care of her anymore. Pt was here and admitted on July 1st with failure to thrive. Pt has dememtia

## 2020-07-07 NOTE — ED PROVIDER NOTE - OBJECTIVE STATEMENT
70 yo female, pmh of dementia on risperidone, presents to ed for eval. pt here with , also pt,  states unable to care for pt, he keeps falling when trying to care for her. as per ems pt was inbed in own urine. at this time pt is well appearing, vss, nad.

## 2020-07-07 NOTE — H&P ADULT - NSHPPHYSICALEXAM_GEN_ALL_CORE
Vital Signs Last 24 Hrs  T(C): 36.3 (07 Jul 2020 10:22), Max: 36.3 (07 Jul 2020 10:22)  T(F): 97.4 (07 Jul 2020 10:22), Max: 97.4 (07 Jul 2020 10:22)  HR: 87 (07 Jul 2020 10:22) (87 - 87)  BP: 163/74 (07 Jul 2020 10:22) (163/74 - 163/74)  BP(mean): --  RR: 18 (07 Jul 2020 10:22) (18 - 18)  SpO2: 100% (07 Jul 2020 10:22) (100% - 100%)    PHYSICAL EXAM:  GENERAL: NAD, speaks in full sentences, no signs of respiratory distress  HEAD:  Atraumatic, Normocephalic  EYES: Anciterc  NECK: Supple, No JVD  CHEST/LUNG: Clear to auscultation bilaterally; No wheeze; No crackles; No accessory muscles used  HEART: Regular rate and rhythm; No murmurs;   ABDOMEN: Soft, Nontender, Nondistended; Bowel sounds present; No guarding NO HSM  EXTREMITIES:  2+ Peripheral Pulses, No cyanosis or edema  PSYCH: AAOx3-has no insight into current situation-cannot tell me why she is here in hospital and is tangiental   NEUROLOGY: non-focal  SKIN: No rashes or lesions

## 2020-07-07 NOTE — H&P ADULT - ASSESSMENT
69y F with chronic paranoid schizophrenia paranoid with recent d/c from [facility] on 7/2/2020 where she was evaluated for paranoid delusions presents for inability to care for self. Pt's  is currently being admitted. He is her primary care giver so with him in the hospital she is unsafe by herself at home.    Chronic schizophrenia w fixed delusion, no behavioral disturbance  - last seen by psych on 7/2/2020  - c/w risperdal  - pt aaox 3 but has no insight into current conditition    Inability to care for self  -social work eval  - pt/rehab c/s    Transaminitis  - new  - could be due to hemolyzed specimen  - no abdominal pain  - f/u AM CMP    Pyuria  - denies dysuria  - monitor off abx  - f/u cultures    DVT px  Full Code for now  OOB with assistance

## 2020-07-07 NOTE — PHYSICAL THERAPY INITIAL EVALUATION ADULT - IMPAIRMENTS FOUND, PT EVAL
ergonomics and body mechanics/gait, locomotion, and balance/muscle strength/aerobic capacity/endurance/posture

## 2020-07-07 NOTE — PHYSICAL THERAPY INITIAL EVALUATION ADULT - GENERAL OBSERVATIONS, REHAB EVAL
14:05 - 14:30. Chart reviewed. Patient available to be seen for physical therapy, confirmed with nurse. Patient encountered semi-reclined on ED stretcher. Denies pain at rest, agreeable for PT evaluation.

## 2020-07-07 NOTE — PHYSICAL THERAPY INITIAL EVALUATION ADULT - GAIT DEVIATIONS NOTED, PT EVAL
increased time in double stance/narrow base of support, decreased heel strike / push off/decreased riley/decreased step length/decreased weight-shifting ability

## 2020-07-07 NOTE — ED PROVIDER NOTE - CLINICAL SUMMARY MEDICAL DECISION MAKING FREE TEXT BOX
In my opinion, in patient treatment is medically justifiable and appropriate. pt safety is a concern.

## 2020-07-07 NOTE — H&P ADULT - NSHPLABSRESULTS_GEN_ALL_CORE
14.2   9.04  )-----------( 319      ( 2020 10:30 )             41.8           136  |  95<L>  |  18  ----------------------------<  93  3.7   |  27  |  0.7    Ca    9.9      2020 10:30    TPro  6.6  /  Alb  4.2  /  TBili  0.5  /  DBili  x   /  AST  84<H>  /  ALT  78<H>  /  AlkPhos  130<H>                Urinalysis Basic - ( 2020 12:00 )    Color: Yellow / Appearance: Clear / S.010 / pH: x  Gluc: x / Ketone: Negative  / Bili: Negative / Urobili: 0.2 mg/dL   Blood: x / Protein: Trace mg/dL / Nitrite: Negative   Leuk Esterase: Large / RBC: 6-10 /HPF / WBC >50 /HPF   Sq Epi: x / Non Sq Epi: Occasional /HPF / Bacteria: Few            Lactate Trend      CARDIAC MARKERS ( 2020 11:42 )  x     / <0.01 ng/mL / 36 U/L / x     / x            CAPILLARY BLOOD GLUCOSE            Culture Results:   No Growth Final ( @ 13:38)  Culture Results:   No Growth Final ( @ 13:38)  Culture Results:   No growth ( @ 13:38)

## 2020-07-07 NOTE — ED PROVIDER NOTE - CARE PLAN
Principal Discharge DX:	FTT (failure to thrive) in adult  Secondary Diagnosis:	UTI (urinary tract infection)  Secondary Diagnosis:	Dementia

## 2020-07-07 NOTE — ED ADULT NURSE NOTE - PMH
No pertinent past medical history <<----- Click to add NO pertinent Past Medical History Chronic schizophrenia    Dementia

## 2020-07-07 NOTE — PHYSICAL THERAPY INITIAL EVALUATION ADULT - ADDITIONAL COMMENTS
+13 steps to 2nd level ; patient reports  is her primary caregiver, and he is currently being admitted to the hospital as well

## 2020-07-07 NOTE — ED ADULT NURSE NOTE - NSIMPLEMENTINTERV_GEN_ALL_ED
Implemented All Fall with Harm Risk Interventions:  [place] to call system. Call bell, personal items and telephone within reach. Instruct patient to call for assistance. Room bathroom lighting operational. Non-slip footwear when patient is off stretcher. Physically safe environment: no spills, clutter or unnecessary equipment. Stretcher in lowest position, wheels locked, appropriate side rails in place. Provide visual cue, wrist band, yellow gown, etc. Monitor gait and stability. Monitor for mental status changes and reorient to person, place, and time. Review medications for side effects contributing to fall risk. Reinforce activity limits and safety measures with patient and family. Provide visual clues: red socks.

## 2020-07-08 LAB
ALBUMIN SERPL ELPH-MCNC: 3.6 G/DL — SIGNIFICANT CHANGE UP (ref 3.5–5.2)
ALP SERPL-CCNC: 140 U/L — HIGH (ref 30–115)
ALT FLD-CCNC: 80 U/L — HIGH (ref 0–41)
ANION GAP SERPL CALC-SCNC: 13 MMOL/L — SIGNIFICANT CHANGE UP (ref 7–14)
AST SERPL-CCNC: 68 U/L — HIGH (ref 0–41)
BASOPHILS # BLD AUTO: 0.02 K/UL — SIGNIFICANT CHANGE UP (ref 0–0.2)
BASOPHILS NFR BLD AUTO: 0.3 % — SIGNIFICANT CHANGE UP (ref 0–1)
BILIRUB SERPL-MCNC: 0.4 MG/DL — SIGNIFICANT CHANGE UP (ref 0.2–1.2)
BUN SERPL-MCNC: 17 MG/DL — SIGNIFICANT CHANGE UP (ref 10–20)
CALCIUM SERPL-MCNC: 8.7 MG/DL — SIGNIFICANT CHANGE UP (ref 8.5–10.1)
CHLORIDE SERPL-SCNC: 92 MMOL/L — LOW (ref 98–110)
CO2 SERPL-SCNC: 28 MMOL/L — SIGNIFICANT CHANGE UP (ref 17–32)
CREAT SERPL-MCNC: 0.7 MG/DL — SIGNIFICANT CHANGE UP (ref 0.7–1.5)
EOSINOPHIL # BLD AUTO: 0.02 K/UL — SIGNIFICANT CHANGE UP (ref 0–0.7)
EOSINOPHIL NFR BLD AUTO: 0.3 % — SIGNIFICANT CHANGE UP (ref 0–8)
GLUCOSE SERPL-MCNC: 106 MG/DL — HIGH (ref 70–99)
HCT VFR BLD CALC: 35.2 % — LOW (ref 37–47)
HGB BLD-MCNC: 12 G/DL — SIGNIFICANT CHANGE UP (ref 12–16)
IMM GRANULOCYTES NFR BLD AUTO: 1.3 % — HIGH (ref 0.1–0.3)
LYMPHOCYTES # BLD AUTO: 0.73 K/UL — LOW (ref 1.2–3.4)
LYMPHOCYTES # BLD AUTO: 9.3 % — LOW (ref 20.5–51.1)
MCHC RBC-ENTMCNC: 30.8 PG — SIGNIFICANT CHANGE UP (ref 27–31)
MCHC RBC-ENTMCNC: 34.1 G/DL — SIGNIFICANT CHANGE UP (ref 32–37)
MCV RBC AUTO: 90.5 FL — SIGNIFICANT CHANGE UP (ref 81–99)
MONOCYTES # BLD AUTO: 1.43 K/UL — HIGH (ref 0.1–0.6)
MONOCYTES NFR BLD AUTO: 18.2 % — HIGH (ref 1.7–9.3)
NEUTROPHILS # BLD AUTO: 5.54 K/UL — SIGNIFICANT CHANGE UP (ref 1.4–6.5)
NEUTROPHILS NFR BLD AUTO: 70.6 % — SIGNIFICANT CHANGE UP (ref 42.2–75.2)
NRBC # BLD: 0 /100 WBCS — SIGNIFICANT CHANGE UP (ref 0–0)
PLATELET # BLD AUTO: 207 K/UL — SIGNIFICANT CHANGE UP (ref 130–400)
POTASSIUM SERPL-MCNC: 4.1 MMOL/L — SIGNIFICANT CHANGE UP (ref 3.5–5)
POTASSIUM SERPL-SCNC: 4.1 MMOL/L — SIGNIFICANT CHANGE UP (ref 3.5–5)
PROT SERPL-MCNC: 5.6 G/DL — LOW (ref 6–8)
RBC # BLD: 3.89 M/UL — LOW (ref 4.2–5.4)
RBC # FLD: 13.2 % — SIGNIFICANT CHANGE UP (ref 11.5–14.5)
SARS-COV-2 IGG SERPL QL IA: NEGATIVE — SIGNIFICANT CHANGE UP
SARS-COV-2 IGM SERPL IA-ACNC: <0.1 INDEX — SIGNIFICANT CHANGE UP
SODIUM SERPL-SCNC: 133 MMOL/L — LOW (ref 135–146)
WBC # BLD: 7.84 K/UL — SIGNIFICANT CHANGE UP (ref 4.8–10.8)
WBC # FLD AUTO: 7.84 K/UL — SIGNIFICANT CHANGE UP (ref 4.8–10.8)

## 2020-07-08 PROCEDURE — 99233 SBSQ HOSP IP/OBS HIGH 50: CPT

## 2020-07-08 PROCEDURE — 71260 CT THORAX DX C+: CPT | Mod: 26

## 2020-07-08 PROCEDURE — 76705 ECHO EXAM OF ABDOMEN: CPT | Mod: 26

## 2020-07-08 NOTE — PROGRESS NOTE ADULT - SUBJECTIVE AND OBJECTIVE BOX
INTERVAL HPI/OVERNIGHT EVENTS:    SUBJECTIVE: Patient seen and examined at bedside.     no cp, sob, abd pain, fever  no fever, dysuria, hematuria, flank pain    OBJECTIVE:    VITAL SIGNS:  Vital Signs Last 24 Hrs  T(C): 37.7 (2020 05:19), Max: 37.7 (2020 05:19)  T(F): 99.9 (2020 05:19), Max: 99.9 (2020 05:19)  HR: 108 (2020 05:19) (92 - 109)  BP: 101/56 (2020 05:19) (101/56 - 135/69)  BP(mean): --  RR: 18 (2020 05:19) (18 - 18)  SpO2: 100% (2020 14:30) (100% - 100%)      PHYSICAL EXAM:    General: NAD  HEENT: NC/AT; PERRL, clear conjunctiva  Neck: supple  Respiratory: CTA b/l  Cardiovascular: +S1/S2; RRR  Abdomen: soft, NT/ND; +BS x4  Extremities: WWP, 2+ peripheral pulses b/l; no LE edema  Skin: normal color and turgor; no rash  Neurological:    MEDICATIONS:  MEDICATIONS  (STANDING):  enoxaparin Injectable 40 milliGRAM(s) SubCutaneous at bedtime  risperiDONE   Tablet 0.5 milliGRAM(s) Oral at bedtime    MEDICATIONS  (PRN):  acetaminophen   Tablet .. 650 milliGRAM(s) Oral every 4 hours PRN Temp greater or equal to 38C (100.4F), Mild Pain (1 - 3)      ALLERGIES:  Allergies    No Known Allergies    Intolerances        LABS:                        12.0   7.84  )-----------( 207      ( 2020 05:47 )             35.2     Hemoglobin: 12.0 g/dL ( @ 05:47)  Hemoglobin: 14.2 g/dL ( @ 10:30)    CBC Full  -  ( 2020 05:47 )  WBC Count : 7.84 K/uL  RBC Count : 3.89 M/uL  Hemoglobin : 12.0 g/dL  Hematocrit : 35.2 %  Platelet Count - Automated : 207 K/uL  Mean Cell Volume : 90.5 fL  Mean Cell Hemoglobin : 30.8 pg  Mean Cell Hemoglobin Concentration : 34.1 g/dL  Auto Neutrophil # : 5.54 K/uL  Auto Lymphocyte # : 0.73 K/uL  Auto Monocyte # : 1.43 K/uL  Auto Eosinophil # : 0.02 K/uL  Auto Basophil # : 0.02 K/uL  Auto Neutrophil % : 70.6 %  Auto Lymphocyte % : 9.3 %  Auto Monocyte % : 18.2 %  Auto Eosinophil % : 0.3 %  Auto Basophil % : 0.3 %        133<L>  |  92<L>  |  17  ----------------------------<  106<H>  4.1   |  28  |  0.7    Ca    8.7      2020 05:47    TPro  5.6<L>  /  Alb  3.6  /  TBili  0.4  /  DBili  x   /  AST  68<H>  /  ALT  80<H>  /  AlkPhos  140<H>      Creatinine Trend: 0.7<--, 0.7<--, 0.6<--, 0.7<--  LIVER FUNCTIONS - ( 2020 05:47 )  Alb: 3.6 g/dL / Pro: 5.6 g/dL / ALK PHOS: 140 U/L / ALT: 80 U/L / AST: 68 U/L / GGT: x               hs Troponin:            Urinalysis Basic - ( 2020 12:00 )    Color: Yellow / Appearance: Clear / S.010 / pH: x  Gluc: x / Ketone: Negative  / Bili: Negative / Urobili: 0.2 mg/dL   Blood: x / Protein: Trace mg/dL / Nitrite: Negative   Leuk Esterase: Large / RBC: 6-10 /HPF / WBC >50 /HPF   Sq Epi: x / Non Sq Epi: Occasional /HPF / Bacteria: Few      CSF:                      EKG:   MICROBIOLOGY:    IMAGING:      Labs, imaging, EKG personally reviewed    RADIOLOGY & ADDITIONAL TESTS: Reviewed.

## 2020-07-08 NOTE — CONSULT NOTE ADULT - ASSESSMENT
IMPRESSION: Rehab of 68 y/o f  rehab   for  debility      PRECAUTIONS: [  ] Cardiac  [  ] Respiratory  [  ] Seizures [  ] Contact Isolation  [  ] Droplet Isolation  [ FALL ] Other    Weight Bearing Status:     RECOMMENDATION:    Out of Bed to Chair     DVT/Decubiti Prophylaxis    REHAB PLAN:     [ xxx  ] Bedside P/T 3-5 times a week   [   ]   Bedside O/T  2-3 times a week             [   ] No Rehab Therapy Indicated                   [   ]  Speech Therapy   Conditioning/ROM                                    ADL  Bed Mobility                                               Conditioning/ROM  Transfers                                                     Bed Mobility  Sitting /Standing Balance                         Transfers                                        Gait Training                                               Sitting/Standing Balance  Stair Training [   ]Applicable                    Home equipment Eval                                                                        Splinting  [   ] Only      GOALS:   ADL   [  x ]   Independent                    Transfers  [ x] Independent                          Ambulation  [ x  ] Independent     [ x   ] With device                            [ x  ]  CG                                                         [x   ]  CG                                                                  [  x ] CG                            [    ] Min A                                                   [   ] Min A                                                              [   ] Min  A          DISCHARGE PLAN:   [   ]  Good candidate for Intensive Rehabilitation/Hospital based-[facility]A [facility]                                             Will tolerate 3hrs Intensive Rehab Daily                                       [  xx  ]  Short Term Rehab in Skilled Nursing Facility                                       [    ]  Home with Outpatient or VN services                                         [    ]  Possible Candidate for Intensive Hospital based Rehab

## 2020-07-08 NOTE — CONSULT NOTE ADULT - SUBJECTIVE AND OBJECTIVE BOX
HPI:  68 y/o  F with chronic paranoid schizophrenia paranoid with recent d/c from [facility] on 2020 where she was evaluated for paranoid delusions presents for inability to care for self. Pt's  is currently being admitted. He is her primary care giver so with him in the hospital she is unsafe by herself at home. ptn seen and  exam  at  bed  side  no  ad  aox2 f/u  command      PTN  REFERRED TO ACUTE  REHAB  FOR  EVAL AND  TX   PAST MEDICAL & SURGICAL HISTORY:  Dementia  Chronic schizophrenia  History of back surgery  Abdominal fistula  History of bilateral knee replacement      Hospital Course:    TODAY'S SUBJECTIVE & REVIEW OF SYMPTOMS:     Constitutional WNL   Cardio WNL   Resp WNL   GI WNL  Heme WNL  Endo WNL  Skin WNL  MSK WNL  Neuro WNL  Cognitive WNL  Psych WNL      MEDICATIONS  (STANDING):  enoxaparin Injectable 40 milliGRAM(s) SubCutaneous at bedtime  risperiDONE   Tablet 0.5 milliGRAM(s) Oral at bedtime    MEDICATIONS  (PRN):  acetaminophen   Tablet .. 650 milliGRAM(s) Oral every 4 hours PRN Temp greater or equal to 38C (100.4F), Mild Pain (1 - 3)      FAMILY HISTORY:  No pertinent family history in first degree relatives      Allergies    No Known Allergies    Intolerances        SOCIAL HISTORY:    [  ] Etoh  [  ] Smoking  [  ] Substance abuse     Home Environment:  [  ] Home Alone  [ x ] Lives with Family    [  ] Home Health Aid    Dwelling:  [  ] Apartment  [ x ] Private House  [  ] Adult Home  [  ] Skilled Nursing Facility      [  ] Short Term  [  ] Long Term  [ x ] Stairs       Elevator [  ]    FUNCTIONAL STATUS PTA: (Check all that apply)  Ambulation: [x   ]Independent    [  ] Dependent     [  ] Non-Ambulatory  Assistive Device: [  ] SA Cane  [  ]  Q Cane  [  ] Walker  [  ]  Wheelchair  ADL : [x  ] Independent  [  ]  Dependent       Vital Signs Last 24 Hrs  T(C): 37.7 (2020 05:19), Max: 37.7 (2020 05:19)  T(F): 99.9 (2020 05:19), Max: 99.9 (2020 05:19)  HR: 108 (2020 05:19) (87 - 109)  BP: 101/56 (2020 05:19) (101/56 - 163/74)  BP(mean): --  RR: 18 (2020 05:19) (18 - 18)  SpO2: 100% (2020 14:30) (100% - 100%)      PHYSICAL EXAM: Alert & Oriented X 2  GENERAL: NAD, well-groomed, well-developed  HEAD:  Atraumatic, Normocephalic  EYES: EOMI, PERRLA, conjunctiva and sclera clear  NECK: Supple, No JVD, Normal thyroid  CHEST/LUNG: Clear to percussion bilaterally; No rales, rhonchi, wheezing, or rubs  HEART: Regular rate and rhythm; No murmurs, rubs, or gallops  ABDOMEN: Soft, Nontender, Nondistended; Bowel sounds present  EXTREMITIES:  2+ Peripheral Pulses, No clubbing, cyanosis, or edema    NERVOUS SYSTEM:  Cranial Nerves 2-12 intact [  x] Abnormal  [  ]  ROM: WFL all extremities [  x]  Abnormal [  ]  Motor Strength: WFL all extremities  [4/5  all ext   ]  Abnormal [  ]  Sensation: intact to light touch [  ] Abnormal [ x ]  Reflexes: Symmetric [  ]  Abnormal [  x]    FUNCTIONAL STATUS:  Bed Mobility: Independent [  ]  Supervision [  ]  Needs Assistance [ x ]  N/A [  ]  Transfers: Independent [  ]  Supervision [  ]  Needs Assistance [  x]  N/A [  ]   Ambulation: Independent [  ]  Supervision [  ]  Needs Assistance [x  ]  N/A [  ]  ADL: Independent [  ] Requires Assistance [  x] N/A [  ]  SEE PT/OT IE NOTES    LABS:                        12.0   7.84  )-----------( 207      ( 2020 05:47 )             35.2     07-07    136  |  95<L>  |  18  ----------------------------<  93  3.7   |  27  |  0.7    Ca    9.9      2020 10:30    TPro  6.6  /  Alb  4.2  /  TBili  0.5  /  DBili  x   /  AST  84<H>  /  ALT  78<H>  /  AlkPhos  130<H>  07-      Urinalysis Basic - ( 2020 12:00 )    Color: Yellow / Appearance: Clear / S.010 / pH: x  Gluc: x / Ketone: Negative  / Bili: Negative / Urobili: 0.2 mg/dL   Blood: x / Protein: Trace mg/dL / Nitrite: Negative   Leuk Esterase: Large / RBC: 6-10 /HPF / WBC >50 /HPF   Sq Epi: x / Non Sq Epi: Occasional /HPF / Bacteria: Few        RADIOLOGY & ADDITIONAL STUDIES:    Assesment:

## 2020-07-08 NOTE — PROGRESS NOTE ADULT - ASSESSMENT
69F PMHx schizophrenia here with inability to care for self; noted to have UTI.    #UTI  positive ua, +increased urinary freq/ incontinence  f/u ucx  start ceftriaxone  f/u bcx  #Schizophrenia  pleasant a+o x3  suspect baseline mental status  f/u PT   currently admitted  risperidone 0.5  #Transaminitis  check ruq  trend  #CXR density  check ct postcontrast per radiology  unclear etiology, possible enlarged aorta  #DVT ppx  lovenox    #Progress Note Handoff:  Pending (specify):  Consults_________, Tests________, Test Results_______, Other____ucx_____  Family discussion:d/w pt at bedside re: treatment plan, primary dx  Disposition: Home___/SNF___/Other________/Unknown at this time__x______

## 2020-07-09 ENCOUNTER — APPOINTMENT (OUTPATIENT)
Dept: GERIATRICS | Facility: HOME HEALTH | Age: 70
End: 2020-07-09

## 2020-07-09 LAB
-  AMIKACIN: SIGNIFICANT CHANGE UP
-  AMPICILLIN/SULBACTAM: SIGNIFICANT CHANGE UP
-  AMPICILLIN: SIGNIFICANT CHANGE UP
-  AZTREONAM: SIGNIFICANT CHANGE UP
-  CEFAZOLIN: SIGNIFICANT CHANGE UP
-  CEFEPIME: SIGNIFICANT CHANGE UP
-  CEFOXITIN: SIGNIFICANT CHANGE UP
-  CEFTRIAXONE: SIGNIFICANT CHANGE UP
-  CIPROFLOXACIN: SIGNIFICANT CHANGE UP
-  GENTAMICIN: SIGNIFICANT CHANGE UP
-  IMIPENEM: SIGNIFICANT CHANGE UP
-  LEVOFLOXACIN: SIGNIFICANT CHANGE UP
-  MEROPENEM: SIGNIFICANT CHANGE UP
-  NITROFURANTOIN: SIGNIFICANT CHANGE UP
-  PIPERACILLIN/TAZOBACTAM: SIGNIFICANT CHANGE UP
-  TIGECYCLINE: SIGNIFICANT CHANGE UP
-  TOBRAMYCIN: SIGNIFICANT CHANGE UP
-  TRIMETHOPRIM/SULFAMETHOXAZOLE: SIGNIFICANT CHANGE UP
ALBUMIN SERPL ELPH-MCNC: 3.3 G/DL — LOW (ref 3.5–5.2)
ALP SERPL-CCNC: 137 U/L — HIGH (ref 30–115)
ALT FLD-CCNC: 71 U/L — HIGH (ref 0–41)
ANION GAP SERPL CALC-SCNC: 12 MMOL/L — SIGNIFICANT CHANGE UP (ref 7–14)
AST SERPL-CCNC: 45 U/L — HIGH (ref 0–41)
BILIRUB SERPL-MCNC: 0.3 MG/DL — SIGNIFICANT CHANGE UP (ref 0.2–1.2)
BUN SERPL-MCNC: 22 MG/DL — HIGH (ref 10–20)
CALCIUM SERPL-MCNC: 7.9 MG/DL — LOW (ref 8.5–10.1)
CHLORIDE SERPL-SCNC: 95 MMOL/L — LOW (ref 98–110)
CO2 SERPL-SCNC: 27 MMOL/L — SIGNIFICANT CHANGE UP (ref 17–32)
CREAT SERPL-MCNC: 0.9 MG/DL — SIGNIFICANT CHANGE UP (ref 0.7–1.5)
CULTURE RESULTS: SIGNIFICANT CHANGE UP
GLUCOSE SERPL-MCNC: 113 MG/DL — HIGH (ref 70–99)
MAGNESIUM SERPL-MCNC: 1.3 MG/DL — LOW (ref 1.8–2.4)
METHOD TYPE: SIGNIFICANT CHANGE UP
ORGANISM # SPEC MICROSCOPIC CNT: SIGNIFICANT CHANGE UP
ORGANISM # SPEC MICROSCOPIC CNT: SIGNIFICANT CHANGE UP
PHOSPHATE SERPL-MCNC: 2.7 MG/DL — SIGNIFICANT CHANGE UP (ref 2.1–4.9)
POTASSIUM SERPL-MCNC: 3.3 MMOL/L — LOW (ref 3.5–5)
POTASSIUM SERPL-SCNC: 3.3 MMOL/L — LOW (ref 3.5–5)
PROT SERPL-MCNC: 5.3 G/DL — LOW (ref 6–8)
SODIUM SERPL-SCNC: 134 MMOL/L — LOW (ref 135–146)
SPECIMEN SOURCE: SIGNIFICANT CHANGE UP

## 2020-07-09 PROCEDURE — 99497 ADVNCD CARE PLAN 30 MIN: CPT

## 2020-07-09 PROCEDURE — 99233 SBSQ HOSP IP/OBS HIGH 50: CPT

## 2020-07-09 RX ORDER — POTASSIUM CHLORIDE 20 MEQ
40 PACKET (EA) ORAL ONCE
Refills: 0 | Status: COMPLETED | OUTPATIENT
Start: 2020-07-09 | End: 2020-07-09

## 2020-07-09 RX ORDER — MAGNESIUM SULFATE 500 MG/ML
2 VIAL (ML) INJECTION
Refills: 0 | Status: COMPLETED | OUTPATIENT
Start: 2020-07-09 | End: 2020-07-09

## 2020-07-09 RX ADMIN — Medication 50 GRAM(S): at 13:43

## 2020-07-09 RX ADMIN — Medication 50 GRAM(S): at 11:18

## 2020-07-09 RX ADMIN — Medication 40 MILLIEQUIVALENT(S): at 11:19

## 2020-07-09 NOTE — PROGRESS NOTE ADULT - ASSESSMENT
69F PMHx schizophrenia here with inability to care for self; noted to have UTI.    #UTI  positive ua, +increased urinary freq/ incontinence  ucx gnr, f/u speciation  cont ceftriaxone  f/u bcx  #Schizophrenia  no evidence overt psychosis, pt was not taking risperidone at home per   f/u PT  will need psych f/u upon d/c  risperidone 0.5  #Transaminitis  ruq noted, possible biliary sludge, +cholelithiasis  trend  #CXR density  ct noted, enlarged pulm arteries  possible valvular disease vs. pHTN, will need tte outpt  #DVT ppx  lovenox    #Progress Note Handoff:  Pending (specify):  Consults_________, Tests________, Test Results_______, Other____ucx_____  Family discussion:d/w pt at bedside re: treatment plan, primary dx  Disposition: Home___/SNF___/Other________/Unknown at this time__x______

## 2020-07-09 NOTE — GOALS OF CARE CONVERSATION - ADVANCED CARE PLANNING - CONVERSATION DETAILS
D/w  who is currently admitted on [address]. Pt with a+o x3 but appears confused at times, does not demonstrate medical capacity.  requests full code.

## 2020-07-09 NOTE — PROGRESS NOTE ADULT - SUBJECTIVE AND OBJECTIVE BOX
INTERVAL HPI/OVERNIGHT EVENTS:    SUBJECTIVE: Patient seen and examined at bedside.     no cp, sob, abd pain, fever  no dysuria, hematuria, fever, flank pain    OBJECTIVE:    VITAL SIGNS:  Vital Signs Last 24 Hrs  T(C): 37.6 (2020 05:00), Max: 37.6 (2020 05:00)  T(F): 99.6 (2020 05:00), Max: 99.6 (2020 05:00)  HR: 66 (2020 05:00) (66 - 105)  BP: 118/59 (2020 05:00) (114/78 - 118/59)  BP(mean): --  RR: 18 (2020 05:00) (18 - 18)  SpO2: --      PHYSICAL EXAM:    General: NAD  HEENT: NC/AT; PERRL, clear conjunctiva  Neck: supple  Respiratory: CTA b/l  Cardiovascular: +S1/S2; RRR  Abdomen: soft, NT/ND; +BS x4  Extremities: WWP, 2+ peripheral pulses b/l; no LE edema  Skin: normal color and turgor; no rash  Neurological:    MEDICATIONS:  MEDICATIONS  (STANDING):  enoxaparin Injectable 40 milliGRAM(s) SubCutaneous at bedtime  magnesium sulfate  IVPB 2 Gram(s) IV Intermittent every 2 hours  potassium chloride    Tablet ER 40 milliEquivalent(s) Oral once  risperiDONE   Tablet 0.5 milliGRAM(s) Oral at bedtime    MEDICATIONS  (PRN):  acetaminophen   Tablet .. 650 milliGRAM(s) Oral every 4 hours PRN Temp greater or equal to 38C (100.4F), Mild Pain (1 - 3)      ALLERGIES:  Allergies    No Known Allergies    Intolerances        LABS:                        12.0   7.84  )-----------( 207      ( 2020 05:47 )             35.2     Hemoglobin: 12.0 g/dL ( @ 05:47)  Hemoglobin: 14.2 g/dL ( @ 10:30)    CBC Full  -  ( 2020 05:47 )  WBC Count : 7.84 K/uL  RBC Count : 3.89 M/uL  Hemoglobin : 12.0 g/dL  Hematocrit : 35.2 %  Platelet Count - Automated : 207 K/uL  Mean Cell Volume : 90.5 fL  Mean Cell Hemoglobin : 30.8 pg  Mean Cell Hemoglobin Concentration : 34.1 g/dL  Auto Neutrophil # : 5.54 K/uL  Auto Lymphocyte # : 0.73 K/uL  Auto Monocyte # : 1.43 K/uL  Auto Eosinophil # : 0.02 K/uL  Auto Basophil # : 0.02 K/uL  Auto Neutrophil % : 70.6 %  Auto Lymphocyte % : 9.3 %  Auto Monocyte % : 18.2 %  Auto Eosinophil % : 0.3 %  Auto Basophil % : 0.3 %        134<L>  |  95<L>  |  22<H>  ----------------------------<  113<H>  3.3<L>   |  27  |  0.9    Ca    7.9<L>      2020 05:28  Phos  2.7       Mg     1.3         TPro  5.3<L>  /  Alb  3.3<L>  /  TBili  0.3  /  DBili  x   /  AST  45<H>  /  ALT  71<H>  /  AlkPhos  137<H>      Creatinine Trend: 0.9<--, 0.7<--, 0.7<--, 0.6<--, 0.7<--  LIVER FUNCTIONS - ( 2020 05:28 )  Alb: 3.3 g/dL / Pro: 5.3 g/dL / ALK PHOS: 137 U/L / ALT: 71 U/L / AST: 45 U/L / GGT: x               hs Troponin:            Urinalysis Basic - ( 2020 12:00 )    Color: Yellow / Appearance: Clear / S.010 / pH: x  Gluc: x / Ketone: Negative  / Bili: Negative / Urobili: 0.2 mg/dL   Blood: x / Protein: Trace mg/dL / Nitrite: Negative   Leuk Esterase: Large / RBC: 6-10 /HPF / WBC >50 /HPF   Sq Epi: x / Non Sq Epi: Occasional /HPF / Bacteria: Few      CSF:                      EKG:   MICROBIOLOGY:    Culture - Urine (collected 2020 12:00)  Source: .Urine Catheterized  Preliminary Report (2020 22:01):    >100,000 CFU/ml Gram Negative Rods      IMAGING:      Labs, imaging, EKG personally reviewed    RADIOLOGY & ADDITIONAL TESTS: Reviewed.

## 2020-07-10 ENCOUNTER — TRANSCRIPTION ENCOUNTER (OUTPATIENT)
Age: 70
End: 2020-07-10

## 2020-07-10 VITALS
SYSTOLIC BLOOD PRESSURE: 120 MMHG | RESPIRATION RATE: 18 BRPM | DIASTOLIC BLOOD PRESSURE: 70 MMHG | TEMPERATURE: 97 F | HEART RATE: 85 BPM

## 2020-07-10 LAB
ANION GAP SERPL CALC-SCNC: 8 MMOL/L — SIGNIFICANT CHANGE UP (ref 7–14)
BUN SERPL-MCNC: 18 MG/DL — SIGNIFICANT CHANGE UP (ref 10–20)
CALCIUM SERPL-MCNC: 8.4 MG/DL — LOW (ref 8.5–10.1)
CHLORIDE SERPL-SCNC: 97 MMOL/L — LOW (ref 98–110)
CO2 SERPL-SCNC: 28 MMOL/L — SIGNIFICANT CHANGE UP (ref 17–32)
CREAT SERPL-MCNC: 0.5 MG/DL — LOW (ref 0.7–1.5)
GLUCOSE SERPL-MCNC: 101 MG/DL — HIGH (ref 70–99)
HCT VFR BLD CALC: 30.5 % — LOW (ref 37–47)
HGB BLD-MCNC: 10.3 G/DL — LOW (ref 12–16)
MAGNESIUM SERPL-MCNC: 2.2 MG/DL — SIGNIFICANT CHANGE UP (ref 1.8–2.4)
MCHC RBC-ENTMCNC: 31.3 PG — HIGH (ref 27–31)
MCHC RBC-ENTMCNC: 33.8 G/DL — SIGNIFICANT CHANGE UP (ref 32–37)
MCV RBC AUTO: 92.7 FL — SIGNIFICANT CHANGE UP (ref 81–99)
NRBC # BLD: 0 /100 WBCS — SIGNIFICANT CHANGE UP (ref 0–0)
PHOSPHATE SERPL-MCNC: 2.8 MG/DL — SIGNIFICANT CHANGE UP (ref 2.1–4.9)
PLATELET # BLD AUTO: 205 K/UL — SIGNIFICANT CHANGE UP (ref 130–400)
POTASSIUM SERPL-MCNC: 4.1 MMOL/L — SIGNIFICANT CHANGE UP (ref 3.5–5)
POTASSIUM SERPL-SCNC: 4.1 MMOL/L — SIGNIFICANT CHANGE UP (ref 3.5–5)
RBC # BLD: 3.29 M/UL — LOW (ref 4.2–5.4)
RBC # FLD: 13.7 % — SIGNIFICANT CHANGE UP (ref 11.5–14.5)
SODIUM SERPL-SCNC: 133 MMOL/L — LOW (ref 135–146)
WBC # BLD: 5.58 K/UL — SIGNIFICANT CHANGE UP (ref 4.8–10.8)
WBC # FLD AUTO: 5.58 K/UL — SIGNIFICANT CHANGE UP (ref 4.8–10.8)

## 2020-07-10 PROCEDURE — 99239 HOSP IP/OBS DSCHRG MGMT >30: CPT

## 2020-07-10 RX ORDER — CEFPODOXIME PROXETIL 100 MG
1 TABLET ORAL
Qty: 10 | Refills: 0
Start: 2020-07-10 | End: 2020-07-14

## 2020-07-10 NOTE — DISCHARGE NOTE PROVIDER - NSDCMRMEDTOKEN_GEN_ALL_CORE_FT
cefpodoxime 200 mg oral tablet: 1 tab(s) orally 2 times a day to end 7/14  RisperDAL 0.5 mg oral tablet: 1 tab(s) orally once a day (at bedtime)

## 2020-07-10 NOTE — DISCHARGE NOTE PROVIDER - HOSPITAL COURSE
69F PMHx schizophrenia here with inability to care for self; lives with  who was recently admitted to hospital. On evaluation pt calm, oriented, no gross psychosis. Recently placed on risperidone but had not been taking at home. Also noted to have UTI, ecoli, pan sensitive, received     course ceftriaxone, will need to complete course on vantin. She improved throughout hospitilization, stable for d/c on 7/10, will need pmd, psych f/u one week.        31 minutes spent on discharge planning. 69F PMHx schizophrenia here with inability to care for self; lives with  who was recently admitted to hospital. On evaluation pt calm, oriented, no gross psychosis. Recently placed on risperidone but had not been taking at home. Also noted to have UTI, ecoli, pan sensitive, received     course ceftriaxone, will need to complete course on vantin. She improved throughout hospitilization, stable for d/c on 7/10, will need pmd, psych f/u one week.    Notably she was found to have enlarged pulm arteries incidentally, may reflect valvular disease vs. pHTN, needs to f/u pmd for possible tte.        31 minutes spent on discharge planning.

## 2020-07-10 NOTE — PROGRESS NOTE ADULT - ASSESSMENT
69F PMHx schizophrenia here with inability to care for self; noted to have UTI.    #UTI  positive ua, +increased urinary freq/ incontinence  ucx ecoli, pan sensitive  stable for d/c today, will complete course on vantin; needs outpt pmd, psych f/u one week  f/u bcx  #Schizophrenia  no evidence overt psychosis, pt was not taking risperidone at home per   f/u PT  will need psych f/u upon d/c  risperidone 0.5  #Transaminitis  ruq noted, possible biliary sludge, +cholelithiasis  outpt f/u  #CXR density  ct noted, enlarged pulm arteries  possible valvular disease vs. pHTN, will need tte outpt  #DVT ppx  lovenox

## 2020-07-10 NOTE — DISCHARGE NOTE PROVIDER - CARE PROVIDER_API CALL
Julius Mustafa  MEDICINE - PHYSICIANS  [address] [place] [address]  Phone: [phone]  Fax: [phone]  Follow Up Time:     Starr Castillo  PSYCHIATRY  [address] [place] [id number]  Phone: [phone]  Fax: [phone]  Follow Up Time:

## 2020-07-10 NOTE — PROGRESS NOTE ADULT - SUBJECTIVE AND OBJECTIVE BOX
INTERVAL HPI/OVERNIGHT EVENTS:    SUBJECTIVE: Patient seen and examined at bedside.     no cp, sob, abd pain, fever  no hematuria, dysuria, fever, flank pain    OBJECTIVE:    VITAL SIGNS:  Vital Signs Last 24 Hrs  T(C): 36 (10 Jul 2020 05:00), Max: 36.6 (09 Jul 2020 21:00)  T(F): 96.8 (10 Jul 2020 05:00), Max: 97.8 (09 Jul 2020 21:00)  HR: 85 (10 Jul 2020 05:00) (85 - 104)  BP: 120/70 (10 Jul 2020 05:00) (105/59 - 128/63)  BP(mean): --  RR: 18 (10 Jul 2020 05:00) (18 - 18)  SpO2: --      PHYSICAL EXAM:    General: NAD  HEENT: NC/AT; PERRL, clear conjunctiva  Neck: supple  Respiratory: CTA b/l  Cardiovascular: +S1/S2; RRR  Abdomen: soft, NT/ND; +BS x4  Extremities: WWP, 2+ peripheral pulses b/l; no LE edema  Skin: normal color and turgor; no rash  Neurological:    MEDICATIONS:  MEDICATIONS  (STANDING):  enoxaparin Injectable 40 milliGRAM(s) SubCutaneous at bedtime  risperiDONE   Tablet 0.5 milliGRAM(s) Oral at bedtime    MEDICATIONS  (PRN):  acetaminophen   Tablet .. 650 milliGRAM(s) Oral every 4 hours PRN Temp greater or equal to 38C (100.4F), Mild Pain (1 - 3)      ALLERGIES:  Allergies    No Known Allergies    Intolerances        LABS:                        10.3   5.58  )-----------( 205      ( 10 Jul 2020 05:53 )             30.5     Hemoglobin: 10.3 g/dL (07-10 @ 05:53)  Hemoglobin: 12.0 g/dL (07-08 @ 05:47)  Hemoglobin: 14.2 g/dL (07-07 @ 10:30)    CBC Full  -  ( 10 Jul 2020 05:53 )  WBC Count : 5.58 K/uL  RBC Count : 3.29 M/uL  Hemoglobin : 10.3 g/dL  Hematocrit : 30.5 %  Platelet Count - Automated : 205 K/uL  Mean Cell Volume : 92.7 fL  Mean Cell Hemoglobin : 31.3 pg  Mean Cell Hemoglobin Concentration : 33.8 g/dL  Auto Neutrophil # : x  Auto Lymphocyte # : x  Auto Monocyte # : x  Auto Eosinophil # : x  Auto Basophil # : x  Auto Neutrophil % : x  Auto Lymphocyte % : x  Auto Monocyte % : x  Auto Eosinophil % : x  Auto Basophil % : x    07-10    133<L>  |  97<L>  |  18  ----------------------------<  101<H>  4.1   |  28  |  0.5<L>    Ca    8.4<L>      10 Jul 2020 05:53  Phos  2.8     07-10  Mg     2.2     07-10    TPro  5.3<L>  /  Alb  3.3<L>  /  TBili  0.3  /  DBili  x   /  AST  45<H>  /  ALT  71<H>  /  AlkPhos  137<H>  07-09    Creatinine Trend: 0.5<--, 0.9<--, 0.7<--, 0.7<--, 0.6<--, 0.7<--  LIVER FUNCTIONS - ( 09 Jul 2020 05:28 )  Alb: 3.3 g/dL / Pro: 5.3 g/dL / ALK PHOS: 137 U/L / ALT: 71 U/L / AST: 45 U/L / GGT: x               hs Troponin:              CSF:                      EKG:   MICROBIOLOGY:    Culture - Urine (collected 07 Jul 2020 12:00)  Source: .Urine Catheterized  Final Report (09 Jul 2020 19:37):    >100,000 CFU/ml Escherichia coli  Organism: Escherichia coli (09 Jul 2020 19:37)  Organism: Escherichia coli (09 Jul 2020 19:37)      IMAGING:      Labs, imaging, EKG personally reviewed    RADIOLOGY & ADDITIONAL TESTS: Reviewed.

## 2020-07-10 NOTE — DISCHARGE NOTE PROVIDER - NSDCCPCAREPLAN_GEN_ALL_CORE_FT
PRINCIPAL DISCHARGE DIAGNOSIS  Diagnosis: UTI (urinary tract infection)  Assessment and Plan of Treatment: PLEASE FOLLOW UP WITH YOUR PRIMARY CARE DOCTOR, PSYCHIATRIST IN ONE WEEK.      SECONDARY DISCHARGE DIAGNOSES  Diagnosis: Dementia  Assessment and Plan of Treatment:     Diagnosis: UTI (urinary tract infection)  Assessment and Plan of Treatment:

## 2020-07-10 NOTE — DISCHARGE NOTE NURSING/CASE MANAGEMENT/SOCIAL WORK - PATIENT PORTAL LINK FT
You can access the FollowMyHealth Patient Portal offered by [facility] by registering at the following website: [email or web address]. By joining [employer]’s FollowMyHealth portal, you will also be able to view your health information using other applications (apps) compatible with our system.

## 2020-07-15 DIAGNOSIS — F03.90 UNSPECIFIED DEMENTIA WITHOUT BEHAVIORAL DISTURBANCE: ICD-10-CM

## 2020-07-15 DIAGNOSIS — R62.7 ADULT FAILURE TO THRIVE: ICD-10-CM

## 2020-07-15 DIAGNOSIS — B96.20 UNSPECIFIED ESCHERICHIA COLI [E. COLI] AS THE CAUSE OF DISEASES CLASSIFIED ELSEWHERE: ICD-10-CM

## 2020-07-15 DIAGNOSIS — R74.0 NONSPECIFIC ELEVATION OF LEVELS OF TRANSAMINASE AND LACTIC ACID DEHYDROGENASE [LDH]: ICD-10-CM

## 2020-07-15 DIAGNOSIS — N39.0 URINARY TRACT INFECTION, SITE NOT SPECIFIED: ICD-10-CM

## 2020-07-15 DIAGNOSIS — F20.9 SCHIZOPHRENIA, UNSPECIFIED: ICD-10-CM

## 2020-07-15 DIAGNOSIS — I28.9 DISEASE OF PULMONARY VESSELS, UNSPECIFIED: ICD-10-CM

## 2020-07-15 DIAGNOSIS — I27.20 PULMONARY HYPERTENSION, UNSPECIFIED: ICD-10-CM

## 2020-07-15 DIAGNOSIS — Z96.653 PRESENCE OF ARTIFICIAL KNEE JOINT, BILATERAL: ICD-10-CM

## 2020-07-15 DIAGNOSIS — R26.9 UNSPECIFIED ABNORMALITIES OF GAIT AND MOBILITY: ICD-10-CM

## 2020-07-15 DIAGNOSIS — Z74.2 NEED FOR ASSISTANCE AT HOME AND NO OTHER HOUSEHOLD MEMBER ABLE TO RENDER CARE: ICD-10-CM

## 2021-09-09 NOTE — ED ADULT NURSE NOTE - NSSEPSISSUSPECTED_ED_A_ED
Yes Otolaryngologist Procedure Text (A): After obtaining clear surgical margins the patient was sent to otolaryngology for surgical repair.  The patient understands they will receive post-surgical care and follow-up from the referring physician's office.

## 2023-06-30 NOTE — PHYSICAL THERAPY INITIAL EVALUATION ADULT - PHYSICAL ASSIST/NONPHYSICAL ASSIST: STAND/SIT, REHAB EVAL
[facility]  [address] [facility] [address]  Phone: [phone]  Fax: [phone]  [facility] [employer]: [phone]    Pt Name: Indira Yousif  Injury Date: 06/13/2023   Employee ID: [id number] Date of Treatment: 06/30/2023   Company: [employer]      Appointment Time: 11:15 AM Arrived: 11:30 AM   Provider: Aden Rico, DO Time Out: 1:35 PM      Office Treatment:   1. Encounter related to worker's compensation claim    2. Contusion of right elbow, subsequent encounter    3. Right foot strain, subsequent encounter    4. Strain of right shoulder, subsequent encounter    5. Fall, subsequent encounter               Restrictions: Regular Duty, Discharged from Occupational Health     Return As needed. BRAYDON         
1 person assist/verbal cues/for hand placement and technique for safety

## 2024-01-19 NOTE — H&P ADULT - HISTORY OF PRESENT ILLNESS
69y F with chronic paranoid schizophrenia paranoid with recent d/c from [facility] on 7/2/2020 where she was evaluated for paranoid delusions presents for inability to care for self. Pt's  is currently being admitted. He is her primary care giver so with him in the hospital she is unsafe by herself at home.
sip of water 1100
